# Patient Record
Sex: FEMALE | Race: BLACK OR AFRICAN AMERICAN | NOT HISPANIC OR LATINO | Employment: FULL TIME | URBAN - METROPOLITAN AREA
[De-identification: names, ages, dates, MRNs, and addresses within clinical notes are randomized per-mention and may not be internally consistent; named-entity substitution may affect disease eponyms.]

---

## 2023-06-05 ENCOUNTER — APPOINTMENT (OUTPATIENT)
Dept: LAB | Facility: LAB | Age: 23
End: 2023-06-05

## 2023-08-11 ENCOUNTER — APPOINTMENT (OUTPATIENT)
Dept: PRIMARY CARE | Facility: CLINIC | Age: 23
End: 2023-08-11
Payer: COMMERCIAL

## 2023-08-25 ENCOUNTER — APPOINTMENT (OUTPATIENT)
Dept: PRIMARY CARE | Facility: CLINIC | Age: 23
End: 2023-08-25
Payer: COMMERCIAL

## 2024-05-17 PROBLEM — R50.9 FEVER: Status: RESOLVED | Noted: 2024-05-17 | Resolved: 2024-05-17

## 2024-05-17 PROBLEM — J10.1 INFLUENZA DUE TO INFLUENZA A VIRUS: Status: RESOLVED | Noted: 2024-05-17 | Resolved: 2024-05-17

## 2024-05-17 RX ORDER — HYDROXYZINE HYDROCHLORIDE 25 MG/1
TABLET, FILM COATED ORAL
COMMUNITY
Start: 2024-02-21 | End: 2024-05-21 | Stop reason: ALTCHOICE

## 2024-05-21 ENCOUNTER — OFFICE VISIT (OUTPATIENT)
Dept: PRIMARY CARE | Facility: CLINIC | Age: 24
End: 2024-05-21
Payer: COMMERCIAL

## 2024-05-21 VITALS
WEIGHT: 221 LBS | DIASTOLIC BLOOD PRESSURE: 74 MMHG | HEART RATE: 81 BPM | SYSTOLIC BLOOD PRESSURE: 106 MMHG | TEMPERATURE: 97.5 F | OXYGEN SATURATION: 99 %

## 2024-05-21 DIAGNOSIS — K21.9 GASTROESOPHAGEAL REFLUX DISEASE WITHOUT ESOPHAGITIS: Primary | ICD-10-CM

## 2024-05-21 DIAGNOSIS — R45.84 ANHEDONIA: ICD-10-CM

## 2024-05-21 DIAGNOSIS — R45.82 FEELING WORRIED: ICD-10-CM

## 2024-05-21 DIAGNOSIS — R14.0 BLOATING: ICD-10-CM

## 2024-05-21 PROBLEM — J00 ACUTE NASOPHARYNGITIS (COMMON COLD): Status: RESOLVED | Noted: 2024-05-21 | Resolved: 2024-05-21

## 2024-05-21 PROBLEM — R68.89 FREQUENTLY SICK: Status: ACTIVE | Noted: 2024-02-21

## 2024-05-21 PROBLEM — R51.9 HEADACHE: Status: RESOLVED | Noted: 2024-05-21 | Resolved: 2024-05-21

## 2024-05-21 PROBLEM — J45.909 EXTRINSIC ASTHMA (HHS-HCC): Status: ACTIVE | Noted: 2023-01-28

## 2024-05-21 PROBLEM — J10.1 INFLUENZA DUE TO INFLUENZA A VIRUS: Status: ACTIVE | Noted: 2023-01-28

## 2024-05-21 PROBLEM — F41.0 ANXIETY ATTACK: Status: ACTIVE | Noted: 2017-02-08

## 2024-05-21 PROBLEM — D64.9 ANEMIA: Status: ACTIVE | Noted: 2024-05-21

## 2024-05-21 PROBLEM — R05.9 COUGH, UNSPECIFIED: Status: RESOLVED | Noted: 2023-01-28 | Resolved: 2024-05-21

## 2024-05-21 PROBLEM — A09 INTESTINAL INFECTION: Status: RESOLVED | Noted: 2024-05-21 | Resolved: 2024-05-21

## 2024-05-21 PROBLEM — R06.02 SHORTNESS OF BREATH: Status: RESOLVED | Noted: 2023-01-28 | Resolved: 2024-05-21

## 2024-05-21 PROBLEM — N76.0 VAGINITIS: Status: RESOLVED | Noted: 2024-05-21 | Resolved: 2024-05-21

## 2024-05-21 PROBLEM — M79.606 PAIN IN LEG, UNSPECIFIED: Status: RESOLVED | Noted: 2023-01-28 | Resolved: 2024-05-21

## 2024-05-21 PROBLEM — F41.0 PANIC DISORDER WITHOUT AGORAPHOBIA: Status: ACTIVE | Noted: 2017-02-08

## 2024-05-21 PROBLEM — N94.6 DYSMENORRHEA: Status: RESOLVED | Noted: 2024-05-21 | Resolved: 2024-05-21

## 2024-05-21 PROBLEM — R63.5 ABNORMAL WEIGHT GAIN: Status: ACTIVE | Noted: 2024-05-21

## 2024-05-21 PROBLEM — R50.9 FEVER: Status: ACTIVE | Noted: 2024-05-17

## 2024-05-21 PROCEDURE — 99214 OFFICE O/P EST MOD 30 MIN: CPT | Performed by: LICENSED PRACTICAL NURSE

## 2024-05-21 PROCEDURE — 1036F TOBACCO NON-USER: CPT | Performed by: LICENSED PRACTICAL NURSE

## 2024-05-21 ASSESSMENT — LIFESTYLE VARIABLES
HOW OFTEN DURING THE LAST YEAR HAVE YOU HAD A FEELING OF GUILT OR REMORSE AFTER DRINKING: NEVER
HAVE YOU OR SOMEONE ELSE BEEN INJURED AS A RESULT OF YOUR DRINKING: NO
HOW OFTEN DURING THE LAST YEAR HAVE YOU NEEDED AN ALCOHOLIC DRINK FIRST THING IN THE MORNING TO GET YOURSELF GOING AFTER A NIGHT OF HEAVY DRINKING: NEVER
HOW OFTEN DURING THE LAST YEAR HAVE YOU FOUND THAT YOU WERE NOT ABLE TO STOP DRINKING ONCE YOU HAD STARTED: NEVER
HOW OFTEN DO YOU HAVE A DRINK CONTAINING ALCOHOL: 2-3 TIMES A WEEK
HOW OFTEN DURING THE LAST YEAR HAVE YOU BEEN UNABLE TO REMEMBER WHAT HAPPENED THE NIGHT BEFORE BECAUSE YOU HAD BEEN DRINKING: NEVER
SKIP TO QUESTIONS 9-10: 1
HOW OFTEN DURING THE LAST YEAR HAVE YOU FAILED TO DO WHAT WAS NORMALLY EXPECTED FROM YOU BECAUSE OF DRINKING: NEVER
HAS A RELATIVE, FRIEND, DOCTOR, OR ANOTHER HEALTH PROFESSIONAL EXPRESSED CONCERN ABOUT YOUR DRINKING OR SUGGESTED YOU CUT DOWN: NO
AUDIT TOTAL SCORE: 3
HOW MANY STANDARD DRINKS CONTAINING ALCOHOL DO YOU HAVE ON A TYPICAL DAY: 1 OR 2
AUDIT-C TOTAL SCORE: 3
HOW OFTEN DO YOU HAVE SIX OR MORE DRINKS ON ONE OCCASION: NEVER

## 2024-05-21 ASSESSMENT — PATIENT HEALTH QUESTIONNAIRE - PHQ9
1. LITTLE INTEREST OR PLEASURE IN DOING THINGS: NOT AT ALL
SUM OF ALL RESPONSES TO PHQ9 QUESTIONS 1 AND 2: 0
2. FEELING DOWN, DEPRESSED OR HOPELESS: NOT AT ALL

## 2024-05-21 ASSESSMENT — ENCOUNTER SYMPTOMS
ABDOMINAL PAIN: 1
DEPRESSION: 0
LOSS OF SENSATION IN FEET: 0
NERVOUS/ANXIOUS: 1
OCCASIONAL FEELINGS OF UNSTEADINESS: 0

## 2024-05-21 ASSESSMENT — PAIN SCALES - GENERAL: PAINLEVEL: 0-NO PAIN

## 2024-05-21 NOTE — PROGRESS NOTES
[facility]: [facility]  PROGRESS NOTE      Mary Villasenor is a 23 y.o. female that is presenting today for NEW PATIENT.      Subjective   Pt presents to the office today to establish care and for concerns of increased bloating and depressive symptoms. She reports feeling bloating and abdominal discomfort for years consistently more than 3x/week. She describes her breakfast yesterday as coffee. She reported experiencing no symptoms at that time, however after after eating lunch that consisted of Ziti with italian sausage and spaghetti sauce she experienced bloating, flatus, soft stool. She also reports GI upset after eating large meals and laying down. She denies belching, blood in the stool of frequent diarrhea.      She also shares that she has concern of depression as she intermittently struggles with lack of motivation and disinterest with normal activities. She also reports intermittent nervousness and frustrations with life. Ms. Villasenor explains that her mother has a history depression. She is interested in counseling services.       Review of Systems   Gastrointestinal:  Positive for abdominal pain.   Psychiatric/Behavioral:  The patient is nervous/anxious.         Disinterest in activities   All other systems reviewed and are negative.     Objective   Vitals:    05/21/24 0837   BP: 106/74   Pulse: 81   Temp: 36.4 °C (97.5 °F)   SpO2: 99%      There is no height or weight on file to calculate BMI.  Physical Exam  Constitutional:       General: She is not in acute distress.     Appearance: She is not ill-appearing, toxic-appearing or diaphoretic.   Cardiovascular:      Rate and Rhythm: Normal rate and regular rhythm.   Pulmonary:      Effort: Pulmonary effort is normal. No respiratory distress.      Breath sounds: Normal breath sounds. No decreased breath sounds, wheezing, rhonchi or rales.   Abdominal:      General: There is no distension.      Palpations: Abdomen is soft. There is no  "mass.      Tenderness: There is no abdominal tenderness. There is no guarding.      Hernia: No hernia is present.   Psychiatric:         Mood and Affect: Mood and affect normal. Mood is not anxious or depressed.         Speech: Speech normal.         Behavior: Behavior normal.         Cognition and Memory: Cognition normal.      Comments: Very pleasant disposition       Diagnostic Results   Lab Results   Component Value Date    GLUCOSE 98 01/28/2023    CALCIUM 9.5 01/28/2023     01/28/2023    K 3.8 01/28/2023    CO2 24 01/28/2023     01/28/2023    BUN 5 (L) 01/28/2023    CREATININE 0.7 01/28/2023     Lab Results   Component Value Date    ALT 10 01/28/2023    AST 20 01/28/2023    ALKPHOS 87 01/28/2023    BILITOT 0.3 01/28/2023     Lab Results   Component Value Date    WBC 5.9 01/28/2023    HGB 13.9 01/28/2023    HCT 42.3 01/28/2023    MCV 85.8 01/28/2023     01/28/2023     No results found for: \"CHOL\"  No results found for: \"HDL\"  No results found for: \"LDLCALC\"  No results found for: \"TRIG\"  No components found for: \"CHOLHDL\"  No results found for: \"HGBA1C\"  Other labs not included in the list above were reviewed either before or during this encounter.    History    Past Medical History:   Diagnosis Date    Cough, unspecified 01/28/2023    Dysmenorrhea 05/21/2024    Fever 05/17/2024    Headache 05/21/2024    Influenza due to influenza A virus 01/28/2023    Intestinal infection 05/21/2024    Pain in leg, unspecified 01/28/2023    Shortness of breath 01/28/2023    Vaginitis 05/21/2024     History reviewed. No pertinent surgical history.  Family History   Problem Relation Name Age of Onset    Depression Mother Jessup Hamilton     Hypertension Mother Isabel Villasenor     Arthritis Maternal Grandmother Stephanie Darnell     Hypertension Maternal Grandmother Stephanie Darnell      Social History     Socioeconomic History    Marital status: Single     Spouse name: Not on file    Number of children: Not on file    " Years of education: Not on file    Highest education level: Not on file   Occupational History    Not on file   Tobacco Use    Smoking status: Never     Passive exposure: Never    Smokeless tobacco: Never   Substance and Sexual Activity    Alcohol use: Yes     Alcohol/week: 2.0 standard drinks of alcohol     Types: 2 Glasses of wine per week    Drug use: Never    Sexual activity: Yes     Partners: Male     Birth control/protection: Emergency Contraception   Other Topics Concern    Not on file   Social History Narrative    Not on file     Social Determinants of Health     Financial Resource Strain: Not on File (2024)    Received from [employer] [facility]     Financial Resource Strain     Financial Resource Strain: 0   Food Insecurity: Not on File (2024)    Received from [facility]     Food Insecurity     Food: 0   Transportation Needs: Not on File (2024)    Received from [facility]     Transportation Needs     Transportation: 0   Physical Activity: Not on File (2024)    Received from [facility]     Physical Activity     Physical Activity: 0   Stress: Not on File (2024)    Received from [facility]     Stress     Stress: 0   Social Connections: Not on File (2024)    Received from [facility]     Social Connections     Social Connections and Isolation: 0   Intimate Partner Violence: Not on file   Housing Stability: Not on File (2024)    Received from [facility]     Housing Stability     Housin     No Known Allergies  Current Outpatient Medications on File Prior to Visit   Medication Sig Dispense Refill    [DISCONTINUED] hydrOXYzine HCL (Atarax) 25 mg tablet TAKE 1 TABLET BY MOUTH 3 (THREE) TIMES DAILY AS NEEDED FOR ITCHING FOR UP TO 30 DAYS       No current facility-administered medications on file prior to visit.     Immunization History   Administered Date(s) Administered    DTP 1998, 1999, 1999, 2000, 2003    Flu vaccine, quadrivalent, no egg protein, age 6 month or greater (FLUCELVAX)  02/15/2018    HPV 9-valent vaccine (GARDASIL 9) 07/25/2016    HPV, Quadrivalent 11/06/2009, 09/14/2012    Hepatitis A vaccine, pediatric/adolescent (HAVRIX, VAQTA) 09/14/2012, 07/25/2016    Hepatitis B vaccine, pediatric/adolescent (RECOMBIVAX, ENGERIX) 1998, 1998, 03/08/1999, 02/21/2018    HiB, unspecified 1998, 03/08/1999, 07/13/1999    Hib (HbOC) 02/23/2000    Influenza, injectable, quadrivalent 02/10/2022    Influenza, live, intranasal 11/06/2009, 09/14/2012    MMR vaccine, subcutaneous (MMR II) 02/23/2000, 08/20/2003, 02/21/2018    Meningococcal ACWY vaccine (MENVEO) 07/25/2016    Meningococcal ACWY-D (Menactra) 4-valent conjugate vaccine 11/06/2009    Meningococcal B vaccine (BEXSERO) 07/25/2016    Pfizer Gray Cap SARS-CoV-2 02/10/2022, 03/03/2022    Polio, Unspecified 1998, 03/08/1999, 08/20/2003    Poliovirus vaccine, subcutaneous (IPOL) 02/23/2000    Tdap vaccine, age 7 year and older (BOOSTRIX, ADACEL) 11/06/2009, 02/15/2018    Varicella vaccine, subcutaneous (VARIVAX) 08/20/2003, 09/29/2008, 02/21/2018, 03/03/2022     Patient's medical history was reviewed and updated either before or during this encounter.       Assessment/Plan   Problem List Items Addressed This Visit    None  Visit Diagnoses       Gastroesophageal reflux disease without esophagitis    -  Primary    Bloating        Anhedonia        Relevant Orders    Referral to Psychology    Feeling worried        Relevant Orders    Referral to Psychology        Ms. Villasenor is generally doing well. She does report frequent bloating and abdominal pain after eating certain foods as well as after eating large meals and laying down. Ms. Villasenor is not interested in PPI or H2 blocker at this time. She will focus on avoiding caffeine, eating bland, non spicy foods. She also will separate her dry foods from liquids by 30 mins. Finally Ms. Villasenor will avoid large meals and make sure she stays up at least 2 hours after eating before  going  to bed.    Regarding her depressive symptoms and concerns of increased anxiety I place a referral to psychology for therapy and coping skills. She will follow up in 1 month and we will complete her annual physical at that time.     MICHELLE Andrews-CNP

## 2024-06-25 ENCOUNTER — OFFICE VISIT (OUTPATIENT)
Dept: PRIMARY CARE | Facility: CLINIC | Age: 24
End: 2024-06-25
Payer: COMMERCIAL

## 2024-06-25 VITALS — DIASTOLIC BLOOD PRESSURE: 70 MMHG | SYSTOLIC BLOOD PRESSURE: 112 MMHG | WEIGHT: 222 LBS | TEMPERATURE: 98 F

## 2024-06-25 DIAGNOSIS — Z00.00 ANNUAL PHYSICAL EXAM: ICD-10-CM

## 2024-06-25 DIAGNOSIS — K21.9 GASTROESOPHAGEAL REFLUX DISEASE WITHOUT ESOPHAGITIS: ICD-10-CM

## 2024-06-25 DIAGNOSIS — F41.9 ANXIETY: ICD-10-CM

## 2024-06-25 DIAGNOSIS — Z01.89 ENCOUNTER FOR ROUTINE LABORATORY TESTING: Primary | ICD-10-CM

## 2024-06-25 DIAGNOSIS — E55.9 VITAMIN D DEFICIENCY: ICD-10-CM

## 2024-06-25 PROBLEM — R50.9 FEVER: Status: RESOLVED | Noted: 2024-05-17 | Resolved: 2024-06-25

## 2024-06-25 PROBLEM — J10.1 INFLUENZA DUE TO INFLUENZA A VIRUS: Status: RESOLVED | Noted: 2023-01-28 | Resolved: 2024-06-25

## 2024-06-25 PROBLEM — R68.89 FREQUENTLY SICK: Status: RESOLVED | Noted: 2024-02-21 | Resolved: 2024-06-25

## 2024-06-25 PROBLEM — F41.0 PANIC DISORDER WITHOUT AGORAPHOBIA: Status: RESOLVED | Noted: 2017-02-08 | Resolved: 2024-06-25

## 2024-06-25 PROBLEM — F41.0 ANXIETY ATTACK: Status: RESOLVED | Noted: 2017-02-08 | Resolved: 2024-06-25

## 2024-06-25 PROBLEM — J45.909 EXTRINSIC ASTHMA (HHS-HCC): Status: RESOLVED | Noted: 2023-01-28 | Resolved: 2024-06-25

## 2024-06-25 PROBLEM — R63.5 ABNORMAL WEIGHT GAIN: Status: RESOLVED | Noted: 2024-05-21 | Resolved: 2024-06-25

## 2024-06-25 PROBLEM — D64.9 ANEMIA: Status: RESOLVED | Noted: 2024-05-21 | Resolved: 2024-06-25

## 2024-06-25 PROCEDURE — 99214 OFFICE O/P EST MOD 30 MIN: CPT | Performed by: LICENSED PRACTICAL NURSE

## 2024-06-25 ASSESSMENT — PAIN SCALES - GENERAL: PAINLEVEL: 0-NO PAIN

## 2024-06-25 ASSESSMENT — PATIENT HEALTH QUESTIONNAIRE - PHQ9
SUM OF ALL RESPONSES TO PHQ9 QUESTIONS 1 AND 2: 0
1. LITTLE INTEREST OR PLEASURE IN DOING THINGS: NOT AT ALL
2. FEELING DOWN, DEPRESSED OR HOPELESS: NOT AT ALL

## 2024-06-25 NOTE — PROGRESS NOTES
[facility]: [facility]  PROGRESS NOTE      Mary Villasenor is a 23 y.o. female that is presenting today for Follow-up.      Subjective   Ms. Villasenor presents to the office today for her annual physical exam. She was last seen in our office last month for concerns of frequent bloating, abdominal discomfort and anxiety. We discussed natural ways to remedy her GI discomfort and she shares today that she feels an improvement in her symptoms since eliminating red sauces from her diet. Ms. Villasenor said she noticed the red sauces also caused shoulder pain and gassiness.  She shares that drinking lemon water has also helped to keep her GI upset well controlled. Regarding her anxiety she shares that she also recently started psychotherapy.     Today she denies HA, dizziness, blurred vision, SOB, CP, palpitations, ABD pain, changes in her stool, blood in her stool, urinary frequency, blood in her urine, swelling of her legs, increased weakness or new moles.         Review of Systems   All other systems reviewed and are negative.     Objective   Vitals:    06/25/24 0836   BP: 112/70   Temp: 36.7 °C (98 °F)      There is no height or weight on file to calculate BMI.  Physical Exam  HENT:      Head: Normocephalic.   Neck:      Thyroid: No thyroid mass, thyromegaly or thyroid tenderness.      Vascular: No carotid bruit or JVD.   Cardiovascular:      Rate and Rhythm: Normal rate and regular rhythm.      Pulses:           Dorsalis pedis pulses are 2+ on the right side and 2+ on the left side.      Heart sounds: Normal heart sounds, S1 normal and S2 normal.   Pulmonary:      Effort: Pulmonary effort is normal. No respiratory distress.      Breath sounds: Normal breath sounds. No wheezing or rales.   Abdominal:      General: Abdomen is flat. Bowel sounds are normal. There is no distension.      Palpations: Abdomen is soft. There is no mass.      Tenderness: There is no abdominal tenderness. There is no guarding or  "rebound.      Hernia: No hernia is present.   Musculoskeletal:      Right lower leg: No edema.      Left lower leg: No edema.   Lymphadenopathy:      Cervical: No cervical adenopathy.      Right cervical: No superficial, deep or posterior cervical adenopathy.     Left cervical: No superficial, deep or posterior cervical adenopathy.   Neurological:      Motor: Motor function is intact. No weakness.      Gait: Gait is intact.   Psychiatric:         Attention and Perception: Attention normal.         Mood and Affect: Affect normal. Mood is not anxious.         Speech: Speech normal. Speech is not rapid and pressured.       Diagnostic Results   Lab Results   Component Value Date    GLUCOSE 98 01/28/2023    CALCIUM 9.5 01/28/2023     01/28/2023    K 3.8 01/28/2023    CO2 24 01/28/2023     01/28/2023    BUN 5 (L) 01/28/2023    CREATININE 0.7 01/28/2023     Lab Results   Component Value Date    ALT 10 01/28/2023    AST 20 01/28/2023    ALKPHOS 87 01/28/2023    BILITOT 0.3 01/28/2023     Lab Results   Component Value Date    WBC 5.9 01/28/2023    HGB 13.9 01/28/2023    HCT 42.3 01/28/2023    MCV 85.8 01/28/2023     01/28/2023     No results found for: \"CHOL\"  No results found for: \"HDL\"  No results found for: \"LDLCALC\"  No results found for: \"TRIG\"  No components found for: \"CHOLHDL\"  No results found for: \"HGBA1C\"  Other labs not included in the list above were reviewed either before or during this encounter.    History    Past Medical History:   Diagnosis Date    Cough, unspecified 01/28/2023    Dysmenorrhea 05/21/2024    Fever 05/17/2024    Headache 05/21/2024    Influenza due to influenza A virus 01/28/2023    Intestinal infection 05/21/2024    Pain in leg, unspecified 01/28/2023    Shortness of breath 01/28/2023    Vaginitis 05/21/2024     History reviewed. No pertinent surgical history.  Family History   Problem Relation Name Age of Onset    Depression Mother Grill Villasenor     Hypertension Mother " Isabel Villasenor     Arthritis Maternal Grandmother Stephanie Patrick     Hypertension Maternal Grandmother Stephanie Patrick      Social History     Socioeconomic History    Marital status: Single     Spouse name: Not on file    Number of children: Not on file    Years of education: Not on file    Highest education level: Not on file   Occupational History    Not on file   Tobacco Use    Smoking status: Never     Passive exposure: Never    Smokeless tobacco: Never   Vaping Use    Vaping status: Never Used   Substance and Sexual Activity    Alcohol use: Yes     Alcohol/week: 2.0 standard drinks of alcohol     Types: 2 Glasses of wine per week    Drug use: Never    Sexual activity: Yes     Partners: Male     Birth control/protection: Emergency Contraception   Other Topics Concern    Not on file   Social History Narrative    Not on file     Social Determinants of Health     Financial Resource Strain: Not on File (2024)    Received from [facility]     Financial Resource Strain     Financial Resource Strain: 0   Food Insecurity: Not on File (2024)    Received from [facility]     Food Insecurity     Food: 0   Transportation Needs: Not on File (2024)    Received from [facility]     Transportation Needs     Transportation: 0   Physical Activity: Not on File (2024)    Received from [facility]     Physical Activity     Physical Activity: 0   Stress: Not on File (2024)    Received from [facility]     Stress     Stress: 0   Social Connections: Not on File (2024)    Received from [facility]     Social Connections     Social Connections and Isolation: 0   Intimate Partner Violence: Not on file   Housing Stability: Not on File (2024)    Received from [facility]     Housing Stability     Housin     No Known Allergies  No current outpatient medications on file prior to visit.     No current facility-administered medications on file prior to visit.     Immunization History   Administered Date(s) Administered    DTP 1998, 1999,  07/13/1999, 02/23/2000, 08/20/2003    Flu vaccine, quadrivalent, no egg protein, age 6 month or greater (FLUCELVAX) 02/15/2018    HPV 9-valent vaccine (GARDASIL 9) 07/25/2016    HPV, Quadrivalent 11/06/2009, 09/14/2012    Hepatitis A vaccine, pediatric/adolescent (HAVRIX, VAQTA) 09/14/2012, 07/25/2016    Hepatitis B vaccine, 19 yrs and under (RECOMBIVAX, ENGERIX) 1998, 1998, 03/08/1999, 02/21/2018    HiB, unspecified 1998, 03/08/1999, 07/13/1999    Hib (HbOC) 02/23/2000    Influenza, injectable, quadrivalent 02/10/2022    Influenza, live, intranasal 11/06/2009, 09/14/2012    MMR vaccine, subcutaneous (MMR II) 02/23/2000, 08/20/2003, 02/21/2018    Meningococcal ACWY vaccine (MENVEO) 07/25/2016    Meningococcal ACWY-D (Menactra) 4-valent conjugate vaccine 11/06/2009    Meningococcal B vaccine (BEXSERO) 07/25/2016    Pfizer Gray Cap SARS-CoV-2 02/10/2022, 03/03/2022    Polio, Unspecified 1998, 03/08/1999, 08/20/2003    Poliovirus vaccine, subcutaneous (IPOL) 02/23/2000    Tdap vaccine, age 7 year and older (BOOSTRIX, ADACEL) 11/06/2009, 02/15/2018    Varicella vaccine, subcutaneous (VARIVAX) 08/20/2003, 09/29/2008, 02/21/2018, 03/03/2022     Patient's medical history was reviewed and updated either before or during this encounter.       Assessment/Plan   Problem List Items Addressed This Visit       Annual physical exam    Relevant Orders    Comprehensive Metabolic Panel    Lipid Panel    CBC and Auto Differential    Vitamin D 25-Hydroxy,Total (for eval of Vitamin D levels)    Hemoglobin A1C    Hepatic Function Panel    HIV 1/2 Antigen/Antibody Screen with Reflex to Confirmation    Hepatitis C antibody    Referral to Gynecology    Gastroesophageal reflux disease without esophagitis    Vitamin D deficiency    Relevant Orders    Vitamin D 25-Hydroxy,Total (for eval of Vitamin D levels)     Other Visit Diagnoses       Encounter for routine laboratory testing    -  Primary        Ms. Villasenor is doing  well. Her VS are WNL. She noted an improvement in her GI symptoms and is treating her anxiety with psychotherapy.     Regarding her Care Gaps she will be screened for HIV and Hep C today.   She will decline her covid vaccination. I will refer to GYN for a pap smear. I will also order regular annual blood work. She will establish with Herminia Vicente NP. No follow up necessary at this time.     Katheryn Grover, APRN-CNP

## 2024-06-26 PROBLEM — F41.9 ANXIETY: Status: ACTIVE | Noted: 2017-02-08

## 2024-06-26 PROBLEM — E55.9 VITAMIN D DEFICIENCY: Status: ACTIVE | Noted: 2024-06-26

## 2024-06-26 PROBLEM — K21.9 GASTROESOPHAGEAL REFLUX DISEASE WITHOUT ESOPHAGITIS: Status: ACTIVE | Noted: 2024-06-26

## 2024-06-27 ENCOUNTER — DOCUMENTATION (OUTPATIENT)
Dept: OBSTETRICS AND GYNECOLOGY | Facility: CLINIC | Age: 24
End: 2024-06-27

## 2024-06-27 ENCOUNTER — LAB (OUTPATIENT)
Dept: LAB | Facility: LAB | Age: 24
End: 2024-06-27
Payer: COMMERCIAL

## 2024-06-27 DIAGNOSIS — E55.9 VITAMIN D DEFICIENCY: ICD-10-CM

## 2024-06-27 DIAGNOSIS — Z00.00 ANNUAL PHYSICAL EXAM: ICD-10-CM

## 2024-06-27 DIAGNOSIS — E55.9 VITAMIN D INSUFFICIENCY: Primary | ICD-10-CM

## 2024-06-27 DIAGNOSIS — Z01.89 ENCOUNTER FOR ROUTINE LABORATORY TESTING: ICD-10-CM

## 2024-06-27 LAB
25(OH)D3 SERPL-MCNC: 25 NG/ML (ref 30–100)
ALBUMIN SERPL BCP-MCNC: 4.1 G/DL (ref 3.4–5)
ALP SERPL-CCNC: 74 U/L (ref 33–110)
ALT SERPL W P-5'-P-CCNC: 14 U/L (ref 7–45)
ANION GAP SERPL CALC-SCNC: 15 MMOL/L (ref 10–20)
AST SERPL W P-5'-P-CCNC: 18 U/L (ref 9–39)
BASOPHILS # BLD AUTO: 0.03 X10*3/UL (ref 0–0.1)
BASOPHILS NFR BLD AUTO: 0.6 %
BILIRUB DIRECT SERPL-MCNC: 0.1 MG/DL (ref 0–0.3)
BILIRUB SERPL-MCNC: 0.4 MG/DL (ref 0–1.2)
BUN SERPL-MCNC: 6 MG/DL (ref 6–23)
CALCIUM SERPL-MCNC: 9.1 MG/DL (ref 8.6–10.6)
CHLORIDE SERPL-SCNC: 104 MMOL/L (ref 98–107)
CHOLEST SERPL-MCNC: 104 MG/DL (ref 0–199)
CHOLESTEROL/HDL RATIO: 2.3
CO2 SERPL-SCNC: 24 MMOL/L (ref 21–32)
CREAT SERPL-MCNC: 0.65 MG/DL (ref 0.5–1.05)
EGFRCR SERPLBLD CKD-EPI 2021: >90 ML/MIN/1.73M*2
EOSINOPHIL # BLD AUTO: 0.06 X10*3/UL (ref 0–0.7)
EOSINOPHIL NFR BLD AUTO: 1.3 %
ERYTHROCYTE [DISTWIDTH] IN BLOOD BY AUTOMATED COUNT: 13.6 % (ref 11.5–14.5)
EST. AVERAGE GLUCOSE BLD GHB EST-MCNC: 105 MG/DL
GLUCOSE SERPL-MCNC: 83 MG/DL (ref 74–99)
HBA1C MFR BLD: 5.3 %
HCT VFR BLD AUTO: 42.1 % (ref 36–46)
HCV AB SER QL: NONREACTIVE
HDLC SERPL-MCNC: 46.1 MG/DL
HGB BLD-MCNC: 13.8 G/DL (ref 12–16)
HIV 1+2 AB+HIV1 P24 AG SERPL QL IA: NONREACTIVE
IMM GRANULOCYTES # BLD AUTO: 0.01 X10*3/UL (ref 0–0.7)
IMM GRANULOCYTES NFR BLD AUTO: 0.2 % (ref 0–0.9)
LDLC SERPL CALC-MCNC: 45 MG/DL
LYMPHOCYTES # BLD AUTO: 1.97 X10*3/UL (ref 1.2–4.8)
LYMPHOCYTES NFR BLD AUTO: 42.2 %
MCH RBC QN AUTO: 28.9 PG (ref 26–34)
MCHC RBC AUTO-ENTMCNC: 32.8 G/DL (ref 32–36)
MCV RBC AUTO: 88 FL (ref 80–100)
MONOCYTES # BLD AUTO: 0.58 X10*3/UL (ref 0.1–1)
MONOCYTES NFR BLD AUTO: 12.4 %
NEUTROPHILS # BLD AUTO: 2.02 X10*3/UL (ref 1.2–7.7)
NEUTROPHILS NFR BLD AUTO: 43.3 %
NON HDL CHOLESTEROL: 58 MG/DL (ref 0–149)
NRBC BLD-RTO: 0 /100 WBCS (ref 0–0)
PLATELET # BLD AUTO: 253 X10*3/UL (ref 150–450)
POTASSIUM SERPL-SCNC: 3.8 MMOL/L (ref 3.5–5.3)
PROT SERPL-MCNC: 7.9 G/DL (ref 6.4–8.2)
RBC # BLD AUTO: 4.78 X10*6/UL (ref 4–5.2)
SODIUM SERPL-SCNC: 139 MMOL/L (ref 136–145)
TRIGL SERPL-MCNC: 66 MG/DL (ref 0–149)
VLDL: 13 MG/DL (ref 0–40)
WBC # BLD AUTO: 4.7 X10*3/UL (ref 4.4–11.3)

## 2024-06-27 PROCEDURE — 82306 VITAMIN D 25 HYDROXY: CPT

## 2024-06-27 PROCEDURE — 83036 HEMOGLOBIN GLYCOSYLATED A1C: CPT

## 2024-06-27 PROCEDURE — 85025 COMPLETE CBC W/AUTO DIFF WBC: CPT

## 2024-06-27 PROCEDURE — 82248 BILIRUBIN DIRECT: CPT

## 2024-06-27 PROCEDURE — 80053 COMPREHEN METABOLIC PANEL: CPT

## 2024-06-27 PROCEDURE — 86803 HEPATITIS C AB TEST: CPT

## 2024-06-27 PROCEDURE — 87389 HIV-1 AG W/HIV-1&-2 AB AG IA: CPT

## 2024-06-27 PROCEDURE — 80061 LIPID PANEL: CPT

## 2024-06-27 PROCEDURE — 36415 COLL VENOUS BLD VENIPUNCTURE: CPT

## 2024-06-27 RX ORDER — VIT C/E/ZN/COPPR/LUTEIN/ZEAXAN 250MG-90MG
25 CAPSULE ORAL DAILY
Qty: 30 CAPSULE | Refills: 11 | Status: SHIPPED | OUTPATIENT
Start: 2024-06-27 | End: 2025-06-27

## 2024-06-27 NOTE — PROGRESS NOTES
Pt walked into clinic. Pt is not a patient here. Pt having anxiety about if her labs are normal that her pcp ordered. Pt advised to call the office, which she did. And to send my chart message. I told her that her provider would be the best one to discuss her results with. Pt also seems very anxious- pt states she has a therapy appointment tomorrow and will discuss with therapist

## 2024-09-09 ENCOUNTER — APPOINTMENT (OUTPATIENT)
Dept: OBSTETRICS AND GYNECOLOGY | Facility: CLINIC | Age: 24
End: 2024-09-09
Payer: COMMERCIAL

## 2024-09-09 VITALS
BODY MASS INDEX: 33.89 KG/M2 | DIASTOLIC BLOOD PRESSURE: 78 MMHG | SYSTOLIC BLOOD PRESSURE: 116 MMHG | WEIGHT: 223.6 LBS | HEIGHT: 68 IN

## 2024-09-09 DIAGNOSIS — Z01.419 ENCOUNTER FOR ANNUAL ROUTINE GYNECOLOGICAL EXAMINATION: Primary | ICD-10-CM

## 2024-09-09 DIAGNOSIS — Z11.3 ROUTINE SCREENING FOR STI (SEXUALLY TRANSMITTED INFECTION): ICD-10-CM

## 2024-09-09 PROCEDURE — 99385 PREV VISIT NEW AGE 18-39: CPT | Performed by: OBSTETRICS & GYNECOLOGY

## 2024-09-09 PROCEDURE — 87491 CHLMYD TRACH DNA AMP PROBE: CPT

## 2024-09-09 PROCEDURE — 3008F BODY MASS INDEX DOCD: CPT | Performed by: OBSTETRICS & GYNECOLOGY

## 2024-09-09 PROCEDURE — 88175 CYTOPATH C/V AUTO FLUID REDO: CPT

## 2024-09-09 PROCEDURE — 1036F TOBACCO NON-USER: CPT | Performed by: OBSTETRICS & GYNECOLOGY

## 2024-09-09 PROCEDURE — 87661 TRICHOMONAS VAGINALIS AMPLIF: CPT

## 2024-09-09 PROCEDURE — 87591 N.GONORRHOEAE DNA AMP PROB: CPT

## 2024-09-09 ASSESSMENT — PAIN SCALES - GENERAL: PAINLEVEL: 0-NO PAIN

## 2024-09-09 NOTE — PROGRESS NOTES
24-year-old obese G0 -American woman presents today for annual GYN exam without gynecologic complaints.    She would like to use absents as a form of contraception, but recently used Plan B twice in the same week.    GynHx: Menarche began at age 6.  She has monthly cycles.  She denies any STIs, PID, abnormal Pap smears or sexual abuse.  She is sexually active 1 male partner.  She received the HPV vaccine.    Subjective   Patient ID: Mayr Villasenor is a 24 y.o. female who presents for New Patient Visit (Pap: 4/2021 WNL//Pt states she is not having any pain, but says she is having discomfort in her lower back and abdomen. She thinks it might have to do with taking two emergency contraceptives.//Isabella Kurtz MA 2).  HPI    Review of Systems    Objective   Physical Exam  Exam conducted with a chaperone present.   HENT:      Head: Normocephalic.      Right Ear: External ear normal.      Left Ear: External ear normal.      Nose: Nose normal.      Mouth/Throat:      Mouth: Mucous membranes are moist.   Eyes:      Extraocular Movements: Extraocular movements intact.      Pupils: Pupils are equal, round, and reactive to light.   Cardiovascular:      Rate and Rhythm: Normal rate and regular rhythm.      Heart sounds: Normal heart sounds.   Pulmonary:      Effort: Pulmonary effort is normal.      Breath sounds: Normal breath sounds.   Chest:   Breasts:     Right: Normal.      Left: Normal.   Abdominal:      Palpations: Abdomen is soft.   Genitourinary:     General: Normal vulva.      Labia:         Right: No rash or lesion.         Left: No rash or lesion.       Vagina: Normal.      Cervix: Normal. No cervical motion tenderness or lesion.      Uterus: Normal.       Adnexa: Right adnexa normal and left adnexa normal.   Musculoskeletal:         General: Normal range of motion.   Skin:     General: Skin is warm and dry.   Neurological:      General: No focal deficit present.      Mental Status: She is alert and  oriented to person, place, and time.   Psychiatric:         Mood and Affect: Mood normal.         Behavior: Behavior normal.     A/P: APE     -  Pap sent     -  Routine STI screening     -  BC info     -  PCP FU     -  RTC 1 year

## 2024-09-09 NOTE — PATIENT INSTRUCTIONS
Thanks for coming in today for your annual GYN exam.      A Pap smear was sent.  Results should be available in the next few weeks.  However, if you have been unable to review the results by the end of the month please give the office a call.      Routine STI testing was sent.  Results should be available in the next 24 to 48 hours.  You may call the office and select option #2 to speak with the nurse to obtain the results.      Review the information about contraceptive options.      Follow-up with your PCP and other healthcare specialist as needed.      Feel free to call the office with any problems, questions or concerns prior to next scheduled visit.

## 2024-09-10 LAB
C TRACH RRNA SPEC QL NAA+PROBE: NEGATIVE
N GONORRHOEA DNA SPEC QL PROBE+SIG AMP: NEGATIVE
T VAGINALIS RRNA SPEC QL NAA+PROBE: NEGATIVE

## 2024-09-19 LAB
CYTOLOGY CMNT CVX/VAG CYTO-IMP: NORMAL
LAB AP HPV GENOTYPE QUESTION: YES
LAB AP HPV HR: NORMAL
LAB AP PAP ADDITIONAL TESTS: NORMAL
LABORATORY COMMENT REPORT: NORMAL
LMP START DATE: NORMAL
PATH REPORT.TOTAL CANCER: NORMAL

## 2025-06-04 RX ORDER — ULIPRISTAL ACETATE 30 MG/1
TABLET ORAL
COMMUNITY
Start: 2024-08-07 | End: 2025-06-10 | Stop reason: ALTCHOICE

## 2025-06-09 NOTE — PROGRESS NOTES
[facility]: [facility]  PROGRESS NOTE      Mary Villasenor is a 24 y.o. female that is presenting today for c/o possible external hemorrhoid.  Patient states she noticed what she thought was a hemorrhoid last week.  She states it has gotten smaller.  Denies any blood in her stool or when she wipes.  She does report  not having a bowel movement daily - does strain at times.  Report she does have intermittent bloating as well    Assessment/Plan   Assessment & Plan  Hemorrhoids, unspecified hemorrhoid type  Drink plenty of water  Increase fiber intake   avoid straining with bowel movements   Avoid constipation  Can us preparation H or tuck pads for any discomfort  Sitz baths as needed         Subjective   HPI  Review of Systems   Constitutional: Negative.    Respiratory: Negative.     Cardiovascular: Negative.    Gastrointestinal:         See hpi for details   Skin: Negative.    Psychiatric/Behavioral: Negative.        Objective   There were no vitals filed for this visit.   There is no height or weight on file to calculate BMI.  Physical Exam  Vitals reviewed.   Constitutional:       Appearance: Normal appearance.   Cardiovascular:      Rate and Rhythm: Normal rate and regular rhythm.      Pulses: Normal pulses.      Heart sounds: Normal heart sounds.   Pulmonary:      Effort: Pulmonary effort is normal.      Breath sounds: Normal breath sounds.   Abdominal:      General: Bowel sounds are normal.      Palpations: Abdomen is soft.   Genitourinary:     Comments: Small external hemorrhoid  No bleeding noted  Skin:     General: Skin is warm and dry.   Neurological:      Mental Status: She is alert and oriented to person, place, and time.   Psychiatric:         Mood and Affect: Mood normal.         Behavior: Behavior normal.         Thought Content: Thought content normal.         Judgment: Judgment normal.       Diagnostic Results   Lab Results   Component Value Date    GLUCOSE 83 06/27/2024     "CALCIUM 9.1 06/27/2024     06/27/2024    K 3.8 06/27/2024    CO2 24 06/27/2024     06/27/2024    BUN 6 06/27/2024    CREATININE 0.65 06/27/2024     Lab Results   Component Value Date    ALT 14 06/27/2024    AST 18 06/27/2024    ALKPHOS 74 06/27/2024    BILITOT 0.4 06/27/2024     Lab Results   Component Value Date    WBC 4.7 06/27/2024    HGB 13.8 06/27/2024    HCT 42.1 06/27/2024    MCV 88 06/27/2024     06/27/2024     Lab Results   Component Value Date    CHOL 104 06/27/2024     Lab Results   Component Value Date    HDL 46.1 06/27/2024     Lab Results   Component Value Date    LDLCALC 45 06/27/2024     Lab Results   Component Value Date    TRIG 66 06/27/2024     No components found for: \"CHOLHDL\"  Lab Results   Component Value Date    HGBA1C 5.3 06/27/2024     Other labs not included in the list above were reviewed either before or during this encounter.    History    Medical History[1]  Surgical History[2]  Family History[3]  Social History     Socioeconomic History    Marital status: Single     Spouse name: Not on file    Number of children: Not on file    Years of education: Not on file    Highest education level: Not on file   Occupational History    Occupation:  for a non Profit   Tobacco Use    Smoking status: Never     Passive exposure: Never    Smokeless tobacco: Never   Vaping Use    Vaping status: Never Used   Substance and Sexual Activity    Alcohol use: Yes     Alcohol/week: 2.0 standard drinks of alcohol     Types: 2 Glasses of wine per week    Drug use: Never    Sexual activity: Yes     Partners: Male     Birth control/protection: Emergency Contraception   Other Topics Concern    Not on file   Social History Narrative    Not on file     Social Drivers of Health     Financial Resource Strain: Not on File (2/21/2024)    Received from [facility]    Financial Resource Strain     Financial Resource Strain: 0   Food Insecurity: Not on File (9/26/2024)    Received from [facility]    " Food Insecurity     Food: 0   Transportation Needs: Not on File (2024)    Received from [facility]    Transportation Needs     Transportation: 0   Physical Activity: Not on File (2024)    Received from [facility]    Physical Activity     Physical Activity: 0   Stress: Not on File (2024)    Received from [facility]    Stress     Stress: 0   Social Connections: Not on File (2024)    Received from [employer]    Social Connections     Connectedness: 0   Intimate Partner Violence: Not on file   Housing Stability: Not on File (2024)    Received from [employer]    Housing Stability     Housin     Allergies[4]  Medications Ordered Prior to Encounter[5]  Immunization History   Administered Date(s) Administered    DTP 1998, 1999, 1999, 2000, 2003    Flu vaccine, quadrivalent, no egg protein, age 6 month or greater (FLUCELVAX) 02/15/2018    HPV 9-valent vaccine (GARDASIL 9) 2016    HPV, Quadrivalent 2009, 2012    Hepatitis A vaccine, pediatric/adolescent (HAVRIX, VAQTA) 2012, 2016    Hepatitis B vaccine, 19 yrs and under (RECOMBIVAX, ENGERIX) 1998, 1998, 1999, 2018    HiB, unspecified 1998, 1999, 1999    Hib (HbOC) 2000    Influenza, injectable, quadrivalent 02/10/2022    Influenza, live, intranasal 2009, 2012    MMR vaccine, subcutaneous (MMR II) 2000, 2003, 2018    Meningococcal ACWY vaccine (MENVEO) 2016    Meningococcal ACWY-D (Menactra) 4-valent conjugate vaccine 2009    Meningococcal B vaccine (BEXSERO) 2016    Pfizer Gray Cap SARS-CoV-2 02/10/2022, 2022    Polio, Unspecified 1998, 1999, 2003    Poliovirus vaccine, subcutaneous (IPOL) 2000    Tdap vaccine, age 7 year and older (BOOSTRIX, ADACEL) 2009, 02/15/2018    Varicella vaccine, subcutaneous (VARIVAX) 2003, 2008, 2018, 2022     Patient's medical  history was reviewed and updated either before or during this encounter.       Sheila Zarate, APRN-CNP         [1]   Past Medical History:  Diagnosis Date    Cough, unspecified 01/28/2023    Dysmenorrhea 05/21/2024    Fever 05/17/2024    Headache 05/21/2024    Influenza due to influenza A virus 01/28/2023    Intestinal infection 05/21/2024    Pain in leg, unspecified 01/28/2023    Shortness of breath 01/28/2023    Vaginitis 05/21/2024   [2] No past surgical history on file.  [3]   Family History  Problem Relation Name Age of Onset    Depression Mother Isabel Villasenor     Hypertension Mother Isabel Villasenor     Arthritis Maternal Grandmother Stephanie Patrick     Hypertension Maternal Grandmother Stephanie Rivasnes    [4] No Known Allergies  [5]   Current Outpatient Medications on File Prior to Visit   Medication Sig Dispense Refill    Manjula 30 mg tablet PLEASE SEE ATTACHED FOR DETAILED DIRECTIONS      cholecalciferol (Vitamin D3) 25 MCG (1000 UT) capsule Take 1 capsule (25 mcg) by mouth once daily. 30 capsule 11     No current facility-administered medications on file prior to visit.

## 2025-06-10 ENCOUNTER — OFFICE VISIT (OUTPATIENT)
Dept: PRIMARY CARE | Facility: CLINIC | Age: 25
End: 2025-06-10
Payer: COMMERCIAL

## 2025-06-10 VITALS
WEIGHT: 235 LBS | HEART RATE: 85 BPM | BODY MASS INDEX: 35.61 KG/M2 | OXYGEN SATURATION: 100 % | SYSTOLIC BLOOD PRESSURE: 119 MMHG | DIASTOLIC BLOOD PRESSURE: 79 MMHG | TEMPERATURE: 97.8 F | HEIGHT: 68 IN

## 2025-06-10 DIAGNOSIS — K64.9 HEMORRHOIDS, UNSPECIFIED HEMORRHOID TYPE: Primary | ICD-10-CM

## 2025-06-10 PROCEDURE — 99213 OFFICE O/P EST LOW 20 MIN: CPT | Performed by: NURSE PRACTITIONER

## 2025-06-10 PROCEDURE — 3008F BODY MASS INDEX DOCD: CPT | Performed by: NURSE PRACTITIONER

## 2025-06-10 PROCEDURE — 1036F TOBACCO NON-USER: CPT | Performed by: NURSE PRACTITIONER

## 2025-06-10 RX ORDER — BISMUTH SUBSALICYLATE 262 MG
1 TABLET,CHEWABLE ORAL DAILY
COMMUNITY

## 2025-06-10 RX ORDER — BUTYROSPERMUM PARKII(SHEA BUTTER), SIMMONDSIA CHINENSIS (JOJOBA) SEED OIL, ALOE BARBADENSIS LEAF EXTRACT .01; 1; 3.5 G/100G; G/100G; G/100G
250 LIQUID TOPICAL 2 TIMES DAILY
COMMUNITY

## 2025-06-10 ASSESSMENT — ENCOUNTER SYMPTOMS
RESPIRATORY NEGATIVE: 1
PSYCHIATRIC NEGATIVE: 1
CARDIOVASCULAR NEGATIVE: 1
ROS GI COMMENTS: SEE HPI FOR DETAILS
CONSTITUTIONAL NEGATIVE: 1

## 2025-06-10 ASSESSMENT — PATIENT HEALTH QUESTIONNAIRE - PHQ9
2. FEELING DOWN, DEPRESSED OR HOPELESS: NOT AT ALL
1. LITTLE INTEREST OR PLEASURE IN DOING THINGS: NOT AT ALL
SUM OF ALL RESPONSES TO PHQ9 QUESTIONS 1 AND 2: 0

## 2025-06-10 ASSESSMENT — PAIN SCALES - GENERAL: PAINLEVEL_OUTOF10: 0-NO PAIN

## 2025-07-01 ENCOUNTER — OFFICE VISIT (OUTPATIENT)
Dept: PRIMARY CARE | Facility: CLINIC | Age: 25
End: 2025-07-01
Payer: COMMERCIAL

## 2025-07-01 ENCOUNTER — APPOINTMENT (OUTPATIENT)
Dept: PRIMARY CARE | Facility: CLINIC | Age: 25
End: 2025-07-01
Payer: COMMERCIAL

## 2025-07-01 VITALS
BODY MASS INDEX: 35.77 KG/M2 | HEIGHT: 68 IN | OXYGEN SATURATION: 98 % | HEART RATE: 84 BPM | DIASTOLIC BLOOD PRESSURE: 70 MMHG | WEIGHT: 236 LBS | SYSTOLIC BLOOD PRESSURE: 116 MMHG | TEMPERATURE: 96 F

## 2025-07-01 DIAGNOSIS — E66.812 CLASS 2 OBESITY WITHOUT SERIOUS COMORBIDITY WITH BODY MASS INDEX (BMI) OF 35.0 TO 35.9 IN ADULT, UNSPECIFIED OBESITY TYPE: ICD-10-CM

## 2025-07-01 DIAGNOSIS — E55.9 VITAMIN D DEFICIENCY: ICD-10-CM

## 2025-07-01 DIAGNOSIS — Z00.00 ANNUAL PHYSICAL EXAM: Primary | ICD-10-CM

## 2025-07-01 DIAGNOSIS — R53.83 FATIGUE, UNSPECIFIED TYPE: ICD-10-CM

## 2025-07-01 LAB
25(OH)D3+25(OH)D2 SERPL-MCNC: 31 NG/ML (ref 30–100)
ANION GAP SERPL CALCULATED.4IONS-SCNC: 11 MMOL/L (CALC) (ref 7–17)
BASOPHILS # BLD AUTO: 21 CELLS/UL (ref 0–200)
BASOPHILS NFR BLD AUTO: 0.5 %
BUN SERPL-MCNC: 6 MG/DL (ref 7–25)
BUN/CREAT SERPL: 10 (CALC) (ref 6–22)
CALCIUM SERPL-MCNC: 8.9 MG/DL (ref 8.6–10.2)
CHLORIDE SERPL-SCNC: 105 MMOL/L (ref 98–110)
CO2 SERPL-SCNC: 23 MMOL/L (ref 20–32)
CREAT SERPL-MCNC: 0.62 MG/DL (ref 0.5–0.96)
EGFRCR SERPLBLD CKD-EPI 2021: 126 ML/MIN/1.73M2
EOSINOPHIL # BLD AUTO: 59 CELLS/UL (ref 15–500)
EOSINOPHIL NFR BLD AUTO: 1.4 %
ERYTHROCYTE [DISTWIDTH] IN BLOOD BY AUTOMATED COUNT: 12.9 % (ref 11–15)
GLUCOSE SERPL-MCNC: 91 MG/DL (ref 65–99)
HCT VFR BLD AUTO: 43.6 % (ref 35–45)
HGB BLD-MCNC: 13.7 G/DL (ref 11.7–15.5)
LYMPHOCYTES # BLD AUTO: 1588 CELLS/UL (ref 850–3900)
LYMPHOCYTES NFR BLD AUTO: 37.8 %
MCH RBC QN AUTO: 28.6 PG (ref 27–33)
MCHC RBC AUTO-ENTMCNC: 31.4 G/DL (ref 32–36)
MCV RBC AUTO: 91 FL (ref 80–100)
MONOCYTES # BLD AUTO: 571 CELLS/UL (ref 200–950)
MONOCYTES NFR BLD AUTO: 13.6 %
NEUTROPHILS # BLD AUTO: 1961 CELLS/UL (ref 1500–7800)
NEUTROPHILS NFR BLD AUTO: 46.7 %
PLATELET # BLD AUTO: 242 THOUSAND/UL (ref 140–400)
PMV BLD REES-ECKER: 10.3 FL (ref 7.5–12.5)
POTASSIUM SERPL-SCNC: 3.9 MMOL/L (ref 3.5–5.3)
RBC # BLD AUTO: 4.79 MILLION/UL (ref 3.8–5.1)
SODIUM SERPL-SCNC: 139 MMOL/L (ref 135–146)
WBC # BLD AUTO: 4.2 THOUSAND/UL (ref 3.8–10.8)

## 2025-07-01 PROCEDURE — 1036F TOBACCO NON-USER: CPT | Performed by: NURSE PRACTITIONER

## 2025-07-01 PROCEDURE — 3008F BODY MASS INDEX DOCD: CPT | Performed by: NURSE PRACTITIONER

## 2025-07-01 PROCEDURE — 99395 PREV VISIT EST AGE 18-39: CPT | Performed by: NURSE PRACTITIONER

## 2025-07-01 ASSESSMENT — ENCOUNTER SYMPTOMS
ADENOPATHY: 0
POLYDIPSIA: 0
BACK PAIN: 0
NECK PAIN: 0
PALPITATIONS: 0
HEADACHES: 0
FACIAL ASYMMETRY: 0
COUGH: 0
DYSURIA: 0
BLOOD IN STOOL: 0
CONFUSION: 0
SHORTNESS OF BREATH: 0
BRUISES/BLEEDS EASILY: 0
FEVER: 0
NAUSEA: 0
VOMITING: 0
POLYPHAGIA: 0
CHILLS: 0
FATIGUE: 0
ABDOMINAL PAIN: 0
OCCASIONAL FEELINGS OF UNSTEADINESS: 0
SEIZURES: 0
DIAPHORESIS: 0
SPEECH DIFFICULTY: 0
AGITATION: 0
FLANK PAIN: 0
HEMATURIA: 0
DEPRESSION: 0
WOUND: 0
CHEST TIGHTNESS: 0
LOSS OF SENSATION IN FEET: 0
DIZZINESS: 0

## 2025-07-01 ASSESSMENT — LIFESTYLE VARIABLES
HOW OFTEN DURING THE LAST YEAR HAVE YOU FOUND THAT YOU WERE NOT ABLE TO STOP DRINKING ONCE YOU HAD STARTED: NEVER
HOW MANY STANDARD DRINKS CONTAINING ALCOHOL DO YOU HAVE ON A TYPICAL DAY: PATIENT DOES NOT DRINK
AUDIT-C TOTAL SCORE: 0
HAVE YOU OR SOMEONE ELSE BEEN INJURED AS A RESULT OF YOUR DRINKING: NO
HAS A RELATIVE, FRIEND, DOCTOR, OR ANOTHER HEALTH PROFESSIONAL EXPRESSED CONCERN ABOUT YOUR DRINKING OR SUGGESTED YOU CUT DOWN: NO
HOW OFTEN DURING THE LAST YEAR HAVE YOU NEEDED AN ALCOHOLIC DRINK FIRST THING IN THE MORNING TO GET YOURSELF GOING AFTER A NIGHT OF HEAVY DRINKING: NEVER
HOW OFTEN DO YOU HAVE SIX OR MORE DRINKS ON ONE OCCASION: NEVER
HOW OFTEN DURING THE LAST YEAR HAVE YOU FAILED TO DO WHAT WAS NORMALLY EXPECTED FROM YOU BECAUSE OF DRINKING: NEVER
AUDIT TOTAL SCORE: 0
SKIP TO QUESTIONS 9-10: 1
HOW OFTEN DURING THE LAST YEAR HAVE YOU BEEN UNABLE TO REMEMBER WHAT HAPPENED THE NIGHT BEFORE BECAUSE YOU HAD BEEN DRINKING: NEVER
HOW OFTEN DO YOU HAVE A DRINK CONTAINING ALCOHOL: NEVER
HOW OFTEN DURING THE LAST YEAR HAVE YOU HAD A FEELING OF GUILT OR REMORSE AFTER DRINKING: NEVER

## 2025-07-01 ASSESSMENT — PATIENT HEALTH QUESTIONNAIRE - PHQ9
2. FEELING DOWN, DEPRESSED OR HOPELESS: NOT AT ALL
SUM OF ALL RESPONSES TO PHQ9 QUESTIONS 1 AND 2: 0
1. LITTLE INTEREST OR PLEASURE IN DOING THINGS: NOT AT ALL

## 2025-07-01 ASSESSMENT — PAIN SCALES - GENERAL: PAINLEVEL_OUTOF10: 0-NO PAIN

## 2025-07-01 NOTE — PROGRESS NOTES
[facility]: [facility]  PHYSICAL EXAM      Mary Villasenor is a 24 y.o. female that is presenting today for Annual Physical Exam. No new health complaints.    Mary is a rebecca young lady who works at the [employer] and is also going to school at [employer] for a degree to further her education.    Encouraged Covid 19 immunization via local pharmacy.    She was seen by Sheila Zarate CNP on 06/10/25 for hemorrhoid. She reports she has not started fiber supplement yet but has increased her activity. She was uncertain about taking fiber supplement that is added to water or a fiber gummie. Advised whichever is easier for her to take regularly.    Assessment/Plan     Diagnoses and all orders for this visit:    Annual physical exam        -     Routine and preventative care provided and discussed with patient    Vitamin D deficiency  -     Vitamin D 25-Hydroxy,Total (for eval of Vitamin D levels); Future  -     continue daily MVI    Class 2 obesity without serious comorbidity with body mass index (BMI) of 35.0 to 35.9 in adult, unspecified obesity type  -     Basic Metabolic Panel; Future    Fatigue, unspecified type  -     CBC and Auto Differential; Future  -     Basic Metabolic Panel; Future    Other orders  -     Follow Up In Primary Care - Health Maintenance; Future    Subjective   HPI  Review of Systems   Constitutional:  Negative for chills, diaphoresis, fatigue and fever.   HENT:  Negative for hearing loss and mouth sores.    Eyes:  Negative for visual disturbance.   Respiratory:  Negative for cough, chest tightness and shortness of breath.    Cardiovascular:  Negative for chest pain, palpitations and leg swelling.   Gastrointestinal:  Negative for abdominal pain, blood in stool, nausea and vomiting.   Endocrine: Negative for cold intolerance, heat intolerance, polydipsia, polyphagia and polyuria.   Genitourinary:  Negative for dysuria, flank pain and hematuria.   Musculoskeletal:  Negative for  back pain and neck pain.   Skin:  Negative for rash and wound.   Allergic/Immunologic: Negative for environmental allergies, food allergies and immunocompromised state.   Neurological:  Negative for dizziness, seizures, syncope, facial asymmetry, speech difficulty and headaches.   Hematological:  Negative for adenopathy. Does not bruise/bleed easily.   Psychiatric/Behavioral:  Negative for agitation and confusion.       Objective   Vitals:    07/01/25 0842   BP: 116/70   Pulse: 84   Temp: 35.6 °C (96 °F)   SpO2: 98%     Body mass index is 35.88 kg/m².  Physical Exam  Vitals and nursing note reviewed.   Constitutional:       General: She is not in acute distress.     Appearance: Normal appearance. She is not ill-appearing.   HENT:      Head: Normocephalic and atraumatic.      Right Ear: Tympanic membrane, ear canal and external ear normal. There is no impacted cerumen.      Left Ear: Tympanic membrane, ear canal and external ear normal. There is no impacted cerumen.      Nose: Nose normal.      Mouth/Throat:      Mouth: Mucous membranes are moist.      Pharynx: Oropharynx is clear. No oropharyngeal exudate or posterior oropharyngeal erythema.   Eyes:      General: No scleral icterus.        Right eye: No discharge.         Left eye: No discharge.      Extraocular Movements: Extraocular movements intact.      Conjunctiva/sclera: Conjunctivae normal.      Pupils: Pupils are equal, round, and reactive to light.   Cardiovascular:      Rate and Rhythm: Normal rate and regular rhythm.      Pulses: Normal pulses.      Heart sounds: Normal heart sounds. No murmur heard.  Pulmonary:      Effort: Pulmonary effort is normal. No respiratory distress.      Breath sounds: Normal breath sounds.   Abdominal:      General: Abdomen is flat. Bowel sounds are normal. There is no distension.      Palpations: Abdomen is soft. There is no mass.      Tenderness: There is no abdominal tenderness. There is no right CVA tenderness or left CVA  "tenderness.   Musculoskeletal:         General: No tenderness. Normal range of motion.      Cervical back: No tenderness.      Right lower leg: No edema.      Left lower leg: No edema.   Lymphadenopathy:      Cervical: No cervical adenopathy.   Skin:     General: Skin is warm and dry.      Coloration: Skin is not jaundiced.      Findings: No rash.   Neurological:      General: No focal deficit present.      Mental Status: She is alert and oriented to person, place, and time. Mental status is at baseline.   Psychiatric:         Mood and Affect: Mood normal.         Behavior: Behavior normal.       Diagnostic Results   Lab Results   Component Value Date    GLUCOSE 83 06/27/2024    CALCIUM 9.1 06/27/2024     06/27/2024    K 3.8 06/27/2024    CO2 24 06/27/2024     06/27/2024    BUN 6 06/27/2024    CREATININE 0.65 06/27/2024     Lab Results   Component Value Date    ALT 14 06/27/2024    AST 18 06/27/2024    ALKPHOS 74 06/27/2024    BILITOT 0.4 06/27/2024     Lab Results   Component Value Date    WBC 4.7 06/27/2024    HGB 13.8 06/27/2024    HCT 42.1 06/27/2024    MCV 88 06/27/2024     06/27/2024     Lab Results   Component Value Date    CHOL 104 06/27/2024     Lab Results   Component Value Date    HDL 46.1 06/27/2024     Lab Results   Component Value Date    LDLCALC 45 06/27/2024     Lab Results   Component Value Date    TRIG 66 06/27/2024     No components found for: \"CHOLHDL\"  Lab Results   Component Value Date    HGBA1C 5.3 06/27/2024     Other labs not included in the list above were reviewed either before or during this encounter.    History   Medical History[1]  Surgical History[2]  Family History[3]  Social History     Socioeconomic History    Marital status: Single     Spouse name: Not on file    Number of children: Not on file    Years of education: Not on file    Highest education level: Not on file   Occupational History    Occupation:  for a non Profit   Tobacco Use    Smoking " status: Never     Passive exposure: Never    Smokeless tobacco: Never   Vaping Use    Vaping status: Never Used   Substance and Sexual Activity    Alcohol use: Not Currently     Alcohol/week: 2.0 standard drinks of alcohol     Types: 2 Glasses of wine per week    Drug use: Never    Sexual activity: Yes     Partners: Male     Birth control/protection: Emergency Contraception   Other Topics Concern    Not on file   Social History Narrative    Not on file     Social Drivers of Health     Financial Resource Strain: Not on File (2024)    Received from [facility]    Financial Resource Strain     Financial Resource Strain: 0   Food Insecurity: Not on File (2024)    Received from [facility]    Food Insecurity     Food: 0   Transportation Needs: Not on File (2024)    Received from [facility]    Transportation Needs     Transportation: 0   Physical Activity: Not on File (2024)    Received from [facility]    Physical Activity     Physical Activity: 0   Stress: Not on File (2024)    Received from [facility]    Stress     Stress: 0   Social Connections: Not on File (2024)    Received from [facility]    Social Connections     Connectedness: 0   Intimate Partner Violence: Not on file   Housing Stability: Not on File (2024)    Received from [facility]    Housing Stability     Housin     Allergies[4]  Medications Ordered Prior to Encounter[5]  Immunization History   Administered Date(s) Administered    DTP 1998, 1999, 1999, 2000, 2003    Flu vaccine, quadrivalent, no egg protein, age 6 month or greater (FLUCELVAX) 02/15/2018    HPV 9-valent vaccine (GARDASIL 9) 2016    HPV, Quadrivalent 2009, 2012    Hepatitis A vaccine, pediatric/adolescent (HAVRIX, VAQTA) 2012, 2016    Hepatitis B vaccine, 19 yrs and under (RECOMBIVAX, ENGERIX) 1998, 1998, 1999, 2018    HiB, unspecified 1998, 1999, 1999    Hib (HbOC) 2000    Influenza,  injectable, quadrivalent 02/10/2022    Influenza, live, intranasal 11/06/2009, 09/14/2012    MMR vaccine, subcutaneous (MMR II) 02/23/2000, 08/20/2003, 02/21/2018    Meningococcal ACWY vaccine (MENVEO) 07/25/2016    Meningococcal ACWY-D (Menactra) 4-valent conjugate vaccine 11/06/2009    Meningococcal B vaccine (BEXSERO) 07/25/2016    Pfizer Gray Cap SARS-CoV-2 02/10/2022, 03/03/2022    Polio, Unspecified 1998, 03/08/1999, 08/20/2003    Poliovirus vaccine, subcutaneous (IPOL) 02/23/2000    Tdap vaccine, age 7 year and older (BOOSTRIX, ADACEL) 11/06/2009, 02/15/2018    Varicella vaccine, subcutaneous (VARIVAX) 08/20/2003, 09/29/2008, 02/21/2018, 03/03/2022     Patient's medical history was reviewed and updated either before or during this encounter.       Herminia Vicente, APRN-CNP         [1]   Past Medical History:  Diagnosis Date    Cough, unspecified 01/28/2023    Dysmenorrhea 05/21/2024    Fever 05/17/2024    Headache 05/21/2024    Influenza due to influenza A virus 01/28/2023    Intestinal infection 05/21/2024    Pain in leg, unspecified 01/28/2023    Shortness of breath 01/28/2023    Vaginitis 05/21/2024   [2] History reviewed. No pertinent surgical history.  [3]   Family History  Problem Relation Name Age of Onset    Depression Mother Isabel Hamilton     Hypertension Mother Isabel Hamilton     Arthritis Maternal Grandmother Stephanie Patrick     Hypertension Maternal Grandmother Stephanie Rivasnes    [4] No Known Allergies  [5]   Current Outpatient Medications on File Prior to Visit   Medication Sig Dispense Refill    multivitamin tablet Take 1 tablet by mouth once daily.      saccharomyces boulardii (Florastor) 250 mg capsule Take 1 capsule (250 mg) by mouth 2 times a day.       No current facility-administered medications on file prior to visit.

## 2025-08-12 ENCOUNTER — TELEPHONE (OUTPATIENT)
Dept: PRIMARY CARE | Facility: CLINIC | Age: 25
End: 2025-08-12
Payer: COMMERCIAL

## 2025-09-15 ENCOUNTER — APPOINTMENT (OUTPATIENT)
Dept: OBSTETRICS AND GYNECOLOGY | Facility: CLINIC | Age: 25
End: 2025-09-15
Payer: COMMERCIAL